# Patient Record
Sex: MALE | Race: WHITE | NOT HISPANIC OR LATINO | Employment: FULL TIME | URBAN - METROPOLITAN AREA
[De-identification: names, ages, dates, MRNs, and addresses within clinical notes are randomized per-mention and may not be internally consistent; named-entity substitution may affect disease eponyms.]

---

## 2018-02-09 ENCOUNTER — HOSPITAL ENCOUNTER (EMERGENCY)
Facility: HOSPITAL | Age: 48
Discharge: HOME | End: 2018-02-09
Admitting: EMERGENCY MEDICINE
Payer: COMMERCIAL

## 2018-02-09 VITALS
WEIGHT: 193 LBS | TEMPERATURE: 97.6 F | BODY MASS INDEX: 27.02 KG/M2 | OXYGEN SATURATION: 97 % | DIASTOLIC BLOOD PRESSURE: 96 MMHG | HEART RATE: 89 BPM | RESPIRATION RATE: 20 BRPM | HEIGHT: 71 IN | SYSTOLIC BLOOD PRESSURE: 160 MMHG

## 2018-02-09 DIAGNOSIS — S01.411A LACERATION OF RIGHT CHEEK, INITIAL ENCOUNTER: Primary | ICD-10-CM

## 2018-02-09 PROCEDURE — 99282 EMERGENCY DEPT VISIT SF MDM: CPT

## 2018-02-09 PROCEDURE — 90715 TDAP VACCINE 7 YRS/> IM: CPT | Performed by: PHYSICIAN ASSISTANT

## 2018-02-09 PROCEDURE — 90471 IMMUNIZATION ADMIN: CPT

## 2018-02-09 RX ORDER — GINSENG 100 MG
1 CAPSULE ORAL ONCE
Status: COMPLETED | OUTPATIENT
Start: 2018-02-09 | End: 2018-02-09

## 2018-02-09 RX ADMIN — TETANUS TOXOID, REDUCED DIPHTHERIA TOXOID AND ACELLULAR PERTUSSIS VACCINE, ADSORBED 0.5 ML: 5; 2.5; 8; 8; 2.5 SUSPENSION INTRAMUSCULAR at 12:47

## 2018-02-09 RX ADMIN — BACITRACIN ZINC 1 SMALL APPLICATION: 500 OINTMENT TOPICAL at 12:50

## 2018-02-09 NOTE — DISCHARGE INSTRUCTIONS
Keep area clean and dry  Watch for signs of infection  If you develop any redness, swelling, warmth, pus or drainage, increased pain or fevers follow up with PCP, [facility] or return to ER  Facial Laceration   WHAT YOU NEED TO KNOW:   A facial laceration is a tear or cut in the skin caused by blunt or shearing forces, or sharp objects  Facial lacerations may be closed within 24 hours of injury  DISCHARGE INSTRUCTIONS:   Return to the emergency department if:   · You have a fever and the wound is painful, warm, or swollen  The wound area may be red, or fluid may come out of it  · You have heavy bleeding or bleeding that does not stop after 10 minutes of holding firm, direct pressure over the wound  Contact your healthcare provider if:   · Your wound reopens or your tape comes off  · Your wound is very painful  · Your wound is not healing, or you think there is an object in the wound  · The skin around your wound stays numb  · You have questions or concerns about your condition or care  Medicines:   · Antibiotics  may be given to prevent an infection if your wound was deep and had to be cleaned out  · Take your medicine as directed  Contact your healthcare provider if you think your medicine is not helping or if you have side effects  Tell him of her if you are allergic to any medicine  Keep a list of the medicines, vitamins, and herbs you take  Include the amounts, and when and why you take them  Bring the list or the pill bottles to follow-up visits  Carry your medicine list with you in case of an emergency  Care for your wound:  Care for your wound as directed to prevent infection and help it heal  Wash your hands with soap and warm water before and after you care for your wound  You may need to keep the wound dry for the first 24 to 48 hours  When your healthcare provider says it is okay, wash around your wound with soap and water, or as directed  Gently pat the area dry   Do not use alcohol or hydrogen peroxide to clean your wound unless you are directed to  · Do not take aspirin or NSAIDs for 24 hours after being injured  Aspirin and NSAIDs can increase blood flow  Your laceration may continue to bleed  · Do not take hot showers, eat or drink hot foods and liquids for 48 hours after being injured  Also, do not use a heating pad near your laceration  The heat can cause swelling in and around your laceration  · If your wound was covered with a bandage,  leave your bandage on as long as directed  Bandages keep your wound clean and protected  They can also prevent swelling  Ask when and how to change your bandage  Be careful not to apply the bandage or tape too tightly  This could cut off blood flow and cause more injury  · If your wound was closed with stitches,  keep your wound clean  Your healthcare provider may recommend that you apply antibiotic ointment after you clean your wound  · If your wound was closed with wound tape or medical strips,  keep the area clean and dry  The strips will usually fall off on their own after several days  · If your wound was closed with tissue glue,  do not use any ointments or lotions on the area  You may shower, but do not swim or soak in a bathtub  Gently pat the area dry after you take a shower  Do not pick at or scrub the glue area  Decrease scarring: The skin in the area of your wound may turn a different color if it is exposed to direct sunlight  After your wound is healed, use sunscreen over the area when you are out in the sun  You should do this for at least 6 months to 1 year after your injury  Some wounds scar less if they are covered while they heal   Follow up with your healthcare provider as directed: You may need to follow up with your healthcare provider in 24 to 48 hours to have your wound checked for infection  You may need to return in 3 to 5 days if you have stitches that need to be removed   Write down your questions so you remember to ask them during your visits  © 2017 [employer] Information is for End User's use only and may not be sold, redistributed or otherwise used for commercial purposes  All illustrations and images included in CareNotes® are the copyrighted property of [employer]  or Shashank Bermudez  The above information is an  only  It is not intended as medical advice for individual conditions or treatments  Talk to your doctor, nurse or pharmacist before following any medical regimen to see if it is safe and effective for you

## 2018-02-09 NOTE — ED NOTES
Patient presents with an approx 2-2 5cm laceration to his right cheek  Bleeding has been controlled   Patient states he was using a pick axe to chip the ice off his chicken coup when he slipped and fell and hit his cheek with the pick axe      Jim Welch RN  02/09/18 8815

## 2018-02-09 NOTE — ED PROVIDER NOTES
History  Chief Complaint   Patient presents with    Facial Laceration     Patient states he was chiiling ice and fell and hit the R side of his face  Laceration noted to R side of face  Slight bleeding noted      40 yo male presents to the ER with R cheek laceration that occurred today when he fell and hit R cheek with table  Pt denies LOC, dizziness, headache, lightheadedness or visual changes  No active bleeding on R cheek  Pt is unsure of last Tetanus injection  Prior to Admission Medications   Prescriptions Last Dose Informant Patient Reported? Taking? ALLOPURINOL PO   Yes Yes   Sig: Take by mouth      Facility-Administered Medications: None       Past Medical History:   Diagnosis Date    Gout        History reviewed  No pertinent surgical history  History reviewed  No pertinent family history  I have reviewed and agree with the history as documented  Social History   Substance Use Topics    Smoking status: Never Smoker    Smokeless tobacco: Never Used    Alcohol use Yes        Review of Systems   Skin: Positive for wound  All other systems reviewed and are negative  Physical Exam  ED Triage Vitals [02/09/18 1135]   Temperature Pulse Respirations Blood Pressure SpO2   97 6 °F (36 4 °C) 89 20 160/96 97 %      Temp src Heart Rate Source Patient Position - Orthostatic VS BP Location FiO2 (%)   -- -- -- -- --      Pain Score       No Pain           Orthostatic Vital Signs  Vitals:    02/09/18 1135   BP: 160/96   Pulse: 89       Physical Exam   Constitutional: He is oriented to person, place, and time  Vital signs are normal  He appears well-developed and well-nourished  HENT:   Head: Normocephalic and atraumatic  Eyes: Conjunctivae and EOM are normal  Pupils are equal, round, and reactive to light  Neck: Normal range of motion  Neck supple  Cardiovascular: Normal rate, regular rhythm and normal heart sounds      Pulmonary/Chest: Effort normal and breath sounds normal  Abdominal: Soft  Bowel sounds are normal    Musculoskeletal: Normal range of motion  Neurological: He is alert and oriented to person, place, and time  Skin: Skin is warm and dry  Laceration (superficial laceration with no active bleeding approx 2 cm to R cheek) noted  Superficial laceration to R cheek that is not actively bleeding and does not open with pressure applied  Wound has closed and was cleaned with sterile saline   No foreign bodies noted on exam   Psychiatric: He has a normal mood and affect  His speech is normal and behavior is normal  Judgment and thought content normal  Cognition and memory are normal    Nursing note and vitals reviewed  ED Medications  Medications   tetanus-diphtheria-acellular pertussis (BOOSTRIX) IM injection 0 5 mL (0 5 mL Intramuscular Given 2/9/18 1247)   bacitracin topical ointment 1 small application (1 small application Topical Given 2/9/18 1250)       Diagnostic Studies  Results Reviewed     None                 No orders to display              Procedures  Procedures       Phone Contacts  ED Phone Contact    ED Course  ED Course                                MDM  Number of Diagnoses or Management Options  Laceration of right cheek, initial encounter: new and does not require workup  Patient Progress  Patient progress: stable    CritCare Time    Disposition  Final diagnoses:   Laceration of right cheek, initial encounter     Time reflects when diagnosis was documented in both MDM as applicable and the Disposition within this note     Time User Action Codes Description Comment    2/9/2018 12:46 PM Eulalio Pollard Add [[id number]] Laceration of right cheek, initial encounter       ED Disposition     ED Disposition Condition Comment    Discharge  Susanne Going discharge to home/self care      Condition at discharge: Stable        Follow-up Information     Follow up With Specialties Details Why Contact Info Varney Scheuermann   As needed [address] [facility] [address] [facility]  [id number] [place] [address]  [address] [facility]  [place]          Discharge Medication List as of 2/9/2018 12:49 PM      CONTINUE these medications which have NOT CHANGED    Details   ALLOPURINOL PO Take by mouth, Historical Med           No discharge procedures on file      ED Provider  Electronically Signed by           Isabelle Carlson PA-C  02/21/18 4195